# Patient Record
Sex: MALE | Race: WHITE | NOT HISPANIC OR LATINO | Employment: OTHER | URBAN - METROPOLITAN AREA
[De-identification: names, ages, dates, MRNs, and addresses within clinical notes are randomized per-mention and may not be internally consistent; named-entity substitution may affect disease eponyms.]

---

## 2019-01-15 ENCOUNTER — LAB REQUISITION (OUTPATIENT)
Dept: LAB | Facility: HOSPITAL | Age: 74
End: 2019-01-15
Payer: COMMERCIAL

## 2019-01-15 DIAGNOSIS — D49.2 NEOPLASM OF UNSPECIFIED BEHAVIOR OF BONE, SOFT TISSUE, AND SKIN: ICD-10-CM

## 2019-01-15 PROCEDURE — 88305 TISSUE EXAM BY PATHOLOGIST: CPT | Performed by: PATHOLOGY

## 2020-09-24 ENCOUNTER — TRANSCRIBE ORDERS (OUTPATIENT)
Dept: ADMINISTRATIVE | Facility: HOSPITAL | Age: 75
End: 2020-09-24

## 2020-09-24 DIAGNOSIS — J43.2 CENTRIACINAR EMPHYSEMA (HCC): Primary | ICD-10-CM

## 2020-10-28 ENCOUNTER — TELEPHONE (OUTPATIENT)
Dept: CARDIAC REHAB | Facility: HOSPITAL | Age: 75
End: 2020-10-28

## 2020-10-29 ENCOUNTER — CLINICAL SUPPORT (OUTPATIENT)
Dept: PULMONOLOGY | Facility: HOSPITAL | Age: 75
End: 2020-10-29
Attending: INTERNAL MEDICINE

## 2020-10-29 DIAGNOSIS — J43.2 CENTRIACINAR EMPHYSEMA (HCC): ICD-10-CM

## 2020-11-02 ENCOUNTER — CLINICAL SUPPORT (OUTPATIENT)
Dept: PULMONOLOGY | Facility: HOSPITAL | Age: 75
End: 2020-11-02
Attending: INTERNAL MEDICINE
Payer: COMMERCIAL

## 2020-11-02 DIAGNOSIS — J43.2 CENTRIACINAR EMPHYSEMA (HCC): ICD-10-CM

## 2020-11-02 PROCEDURE — G0424 PULMONARY REHAB W EXER: HCPCS

## 2020-11-06 ENCOUNTER — CLINICAL SUPPORT (OUTPATIENT)
Dept: PULMONOLOGY | Facility: HOSPITAL | Age: 75
End: 2020-11-06
Attending: INTERNAL MEDICINE
Payer: COMMERCIAL

## 2020-11-06 DIAGNOSIS — J44.9 OBSTRUCTIVE CHRONIC BRONCHITIS WITHOUT EXACERBATION (HCC): ICD-10-CM

## 2020-11-06 PROCEDURE — G0424 PULMONARY REHAB W EXER: HCPCS

## 2020-11-09 ENCOUNTER — CLINICAL SUPPORT (OUTPATIENT)
Dept: PULMONOLOGY | Facility: HOSPITAL | Age: 75
End: 2020-11-09
Attending: INTERNAL MEDICINE
Payer: COMMERCIAL

## 2020-11-09 DIAGNOSIS — J43.2 CENTRIACINAR EMPHYSEMA (HCC): ICD-10-CM

## 2020-11-09 PROCEDURE — G0424 PULMONARY REHAB W EXER: HCPCS

## 2020-11-13 ENCOUNTER — CLINICAL SUPPORT (OUTPATIENT)
Dept: PULMONOLOGY | Facility: HOSPITAL | Age: 75
End: 2020-11-13
Attending: INTERNAL MEDICINE
Payer: COMMERCIAL

## 2020-11-13 DIAGNOSIS — J44.9 CHRONIC OBSTRUCTIVE PULMONARY DISEASE, UNSPECIFIED COPD TYPE (HCC): ICD-10-CM

## 2020-11-13 PROCEDURE — G0424 PULMONARY REHAB W EXER: HCPCS

## 2020-11-16 ENCOUNTER — CLINICAL SUPPORT (OUTPATIENT)
Dept: PULMONOLOGY | Facility: HOSPITAL | Age: 75
End: 2020-11-16
Attending: INTERNAL MEDICINE
Payer: COMMERCIAL

## 2020-11-16 DIAGNOSIS — J44.9 OBSTRUCTIVE CHRONIC BRONCHITIS WITHOUT EXACERBATION (HCC): ICD-10-CM

## 2020-11-16 PROCEDURE — G0424 PULMONARY REHAB W EXER: HCPCS

## 2020-11-20 ENCOUNTER — CLINICAL SUPPORT (OUTPATIENT)
Dept: PULMONOLOGY | Facility: HOSPITAL | Age: 75
End: 2020-11-20
Attending: INTERNAL MEDICINE
Payer: COMMERCIAL

## 2020-11-20 DIAGNOSIS — J44.9 CHRONIC OBSTRUCTIVE PULMONARY DISEASE, UNSPECIFIED COPD TYPE (HCC): ICD-10-CM

## 2020-11-20 PROCEDURE — G0424 PULMONARY REHAB W EXER: HCPCS

## 2020-11-23 ENCOUNTER — CLINICAL SUPPORT (OUTPATIENT)
Dept: PULMONOLOGY | Facility: HOSPITAL | Age: 75
End: 2020-11-23
Attending: INTERNAL MEDICINE
Payer: COMMERCIAL

## 2020-11-23 DIAGNOSIS — J44.9 CHRONIC OBSTRUCTIVE PULMONARY DISEASE, UNSPECIFIED COPD TYPE (HCC): ICD-10-CM

## 2020-11-23 PROCEDURE — G0424 PULMONARY REHAB W EXER: HCPCS

## 2020-11-27 ENCOUNTER — APPOINTMENT (OUTPATIENT)
Dept: PULMONOLOGY | Facility: HOSPITAL | Age: 75
End: 2020-11-27
Attending: INTERNAL MEDICINE
Payer: COMMERCIAL

## 2020-11-30 ENCOUNTER — CLINICAL SUPPORT (OUTPATIENT)
Dept: PULMONOLOGY | Facility: HOSPITAL | Age: 75
End: 2020-11-30
Attending: INTERNAL MEDICINE
Payer: COMMERCIAL

## 2020-11-30 DIAGNOSIS — J44.9 OBSTRUCTIVE CHRONIC BRONCHITIS WITHOUT EXACERBATION (HCC): ICD-10-CM

## 2020-11-30 PROCEDURE — G0424 PULMONARY REHAB W EXER: HCPCS

## 2020-12-04 ENCOUNTER — CLINICAL SUPPORT (OUTPATIENT)
Dept: PULMONOLOGY | Facility: HOSPITAL | Age: 75
End: 2020-12-04
Attending: INTERNAL MEDICINE
Payer: COMMERCIAL

## 2020-12-04 DIAGNOSIS — J44.9 CHRONIC OBSTRUCTIVE PULMONARY DISEASE, UNSPECIFIED COPD TYPE (HCC): ICD-10-CM

## 2020-12-04 PROCEDURE — G0424 PULMONARY REHAB W EXER: HCPCS

## 2020-12-07 ENCOUNTER — CLINICAL SUPPORT (OUTPATIENT)
Dept: PULMONOLOGY | Facility: HOSPITAL | Age: 75
End: 2020-12-07
Attending: INTERNAL MEDICINE
Payer: COMMERCIAL

## 2020-12-07 DIAGNOSIS — J44.9 CHRONIC OBSTRUCTIVE PULMONARY DISEASE, UNSPECIFIED COPD TYPE (HCC): ICD-10-CM

## 2020-12-07 PROCEDURE — G0424 PULMONARY REHAB W EXER: HCPCS

## 2020-12-11 ENCOUNTER — CLINICAL SUPPORT (OUTPATIENT)
Dept: PULMONOLOGY | Facility: HOSPITAL | Age: 75
End: 2020-12-11
Attending: INTERNAL MEDICINE
Payer: COMMERCIAL

## 2020-12-11 DIAGNOSIS — J44.9 COPD WITH CHRONIC BRONCHITIS (HCC): ICD-10-CM

## 2020-12-11 PROCEDURE — G0424 PULMONARY REHAB W EXER: HCPCS

## 2020-12-14 ENCOUNTER — APPOINTMENT (OUTPATIENT)
Dept: PULMONOLOGY | Facility: HOSPITAL | Age: 75
End: 2020-12-14
Attending: INTERNAL MEDICINE
Payer: COMMERCIAL

## 2020-12-18 ENCOUNTER — CLINICAL SUPPORT (OUTPATIENT)
Dept: PULMONOLOGY | Facility: HOSPITAL | Age: 75
End: 2020-12-18
Attending: INTERNAL MEDICINE
Payer: COMMERCIAL

## 2020-12-18 DIAGNOSIS — J44.9 OBSTRUCTIVE CHRONIC BRONCHITIS WITHOUT EXACERBATION (HCC): ICD-10-CM

## 2020-12-18 PROCEDURE — G0424 PULMONARY REHAB W EXER: HCPCS

## 2020-12-21 ENCOUNTER — CLINICAL SUPPORT (OUTPATIENT)
Dept: PULMONOLOGY | Facility: HOSPITAL | Age: 75
End: 2020-12-21
Attending: INTERNAL MEDICINE
Payer: COMMERCIAL

## 2020-12-21 DIAGNOSIS — J44.9 CHRONIC OBSTRUCTIVE PULMONARY DISEASE, UNSPECIFIED COPD TYPE (HCC): ICD-10-CM

## 2020-12-21 PROCEDURE — G0424 PULMONARY REHAB W EXER: HCPCS

## 2020-12-23 ENCOUNTER — CLINICAL SUPPORT (OUTPATIENT)
Dept: PULMONOLOGY | Facility: HOSPITAL | Age: 75
End: 2020-12-23
Attending: INTERNAL MEDICINE
Payer: COMMERCIAL

## 2020-12-23 DIAGNOSIS — J44.9 CHRONIC OBSTRUCTIVE PULMONARY DISEASE, UNSPECIFIED COPD TYPE (HCC): ICD-10-CM

## 2020-12-23 PROCEDURE — G0424 PULMONARY REHAB W EXER: HCPCS

## 2020-12-25 ENCOUNTER — APPOINTMENT (OUTPATIENT)
Dept: PULMONOLOGY | Facility: HOSPITAL | Age: 75
End: 2020-12-25
Attending: INTERNAL MEDICINE
Payer: COMMERCIAL

## 2020-12-28 ENCOUNTER — CLINICAL SUPPORT (OUTPATIENT)
Dept: PULMONOLOGY | Facility: HOSPITAL | Age: 75
End: 2020-12-28
Attending: INTERNAL MEDICINE
Payer: COMMERCIAL

## 2020-12-28 DIAGNOSIS — J44.9 CHRONIC OBSTRUCTIVE PULMONARY DISEASE, UNSPECIFIED COPD TYPE (HCC): ICD-10-CM

## 2020-12-28 PROCEDURE — G0424 PULMONARY REHAB W EXER: HCPCS

## 2020-12-30 ENCOUNTER — CLINICAL SUPPORT (OUTPATIENT)
Dept: PULMONOLOGY | Facility: HOSPITAL | Age: 75
End: 2020-12-30
Attending: INTERNAL MEDICINE
Payer: COMMERCIAL

## 2020-12-30 DIAGNOSIS — J44.9 CHRONIC OBSTRUCTIVE PULMONARY DISEASE, UNSPECIFIED COPD TYPE (HCC): ICD-10-CM

## 2020-12-30 PROCEDURE — G0424 PULMONARY REHAB W EXER: HCPCS

## 2021-01-01 ENCOUNTER — APPOINTMENT (OUTPATIENT)
Dept: PULMONOLOGY | Facility: HOSPITAL | Age: 76
End: 2021-01-01
Attending: INTERNAL MEDICINE
Payer: COMMERCIAL

## 2021-01-04 ENCOUNTER — CLINICAL SUPPORT (OUTPATIENT)
Dept: PULMONOLOGY | Facility: HOSPITAL | Age: 76
End: 2021-01-04
Attending: INTERNAL MEDICINE
Payer: COMMERCIAL

## 2021-01-04 DIAGNOSIS — J44.9 CHRONIC OBSTRUCTIVE PULMONARY DISEASE, UNSPECIFIED COPD TYPE (HCC): ICD-10-CM

## 2021-01-04 PROCEDURE — G0424 PULMONARY REHAB W EXER: HCPCS

## 2021-01-08 ENCOUNTER — CLINICAL SUPPORT (OUTPATIENT)
Dept: PULMONOLOGY | Facility: HOSPITAL | Age: 76
End: 2021-01-08
Attending: INTERNAL MEDICINE
Payer: COMMERCIAL

## 2021-01-08 DIAGNOSIS — J44.9 CHRONIC OBSTRUCTIVE PULMONARY DISEASE, UNSPECIFIED COPD TYPE (HCC): ICD-10-CM

## 2021-01-08 PROCEDURE — G0424 PULMONARY REHAB W EXER: HCPCS

## 2021-01-11 ENCOUNTER — CLINICAL SUPPORT (OUTPATIENT)
Dept: PULMONOLOGY | Facility: HOSPITAL | Age: 76
End: 2021-01-11
Attending: INTERNAL MEDICINE
Payer: COMMERCIAL

## 2021-01-11 DIAGNOSIS — J44.9 CHRONIC OBSTRUCTIVE PULMONARY DISEASE, UNSPECIFIED COPD TYPE (HCC): ICD-10-CM

## 2021-01-11 PROCEDURE — G0424 PULMONARY REHAB W EXER: HCPCS

## 2021-01-15 ENCOUNTER — CLINICAL SUPPORT (OUTPATIENT)
Dept: PULMONOLOGY | Facility: HOSPITAL | Age: 76
End: 2021-01-15
Attending: INTERNAL MEDICINE
Payer: COMMERCIAL

## 2021-01-15 DIAGNOSIS — J44.9 CHRONIC OBSTRUCTIVE PULMONARY DISEASE, UNSPECIFIED COPD TYPE (HCC): ICD-10-CM

## 2021-01-15 PROCEDURE — G0424 PULMONARY REHAB W EXER: HCPCS

## 2021-01-15 NOTE — PROGRESS NOTES
Pulmonary Rehabilitation Plan of Care   90 Day Plan of Care          Today's date: 1/15/2021  Total visits to date:   Patient name: Osmel Ayon      : 1945  Age: 77 y o  MRN: [id number]  Referring Physician: Kiana Birmingham MD  Provider: [address]  Clinician: Lorena Alamo, MPT    Dx: Centriacinar Emphysema  Date of onset: 2020    COMMENTS:  Patient has completed 21 CO visits as of this date  He performs his sessions w/correct hemodynamic responses  Patient's resting vitals were HR 71 and /52  His peak vitals were HR 85 and /72  At recovery his vitals were HR 75 and /68  He performs his sessions w/3liters supplemental 02  He is able to maintain his 02 saturation at  95% or greater  He rates his program a 5 to 6 on a 1-10 RPE scale  He rated his BAILEY as 3-7/10  He is able to work consistently at a 3 17 MET level  Tasia Flores has been a good rehabilitation candidate due to prior high level of function and willingness to progress  His program will continue to be progressed as he is able to tolerate  He will continue to receive education specific to his disease process  Patient is progressing well in [place]  Tasia Flores has also begun a HEP, utilizing a TM for 15 to 20 minutes days off CO  Correct breathing techniques reviewed and practiced a/patient routinely  His standing balance has improved to Good w/out support of assistive device  He denies any dizziness over the past few weeks  BLE strength has improved to 4+/5  Weight has improved from 224 to 215  [facility] states his stress level is still high, at times 10/10  His BAILEY and physical conditioned is very frustrating to him  Medication compliance: Yes   Comments: Patient admits to medication compliance  Fall Risk: Moderate   Comments: Patient exhibits Good dynamic standing balance and 4+/5 BLE strength  Patient does note occasional bouts of dizziness       EXERCISE/ACTIVITY    Cardiopulmonary Goals:   Min: 45-50 minutes   HR: 20-30 > RHR 80-90   RPE: 4-6  (moderate to moderately hard exercise)   O2 sat: >90% Supplemental 02 will be applied and titrated as per department policy   Modalities: Treadmill, UBE, NuStep and Recumbent bike  Strength trainin-3 days / week, 12-15 repitations  and 1-2 sets per modality    Modalities: Leg Press    Exercise Progression: Progress as tolerated to maintain RPE 4-6      RPE 4-6  Home activity: Independent w/personal ADLs  Sedentary Lifestyle  Goals: 10% improvement in functional capacity, home exercise days opposite PA, improved DASI score and >150 mins of exercise/wk  Education: Benefit of exercise, home exercise, pursed lip breathing, RPE scale and O2 saturation monitoring   Plan:home exercise target 30 mins, 2 days opposite PA and Improved 6MW results  Readiness to change: Action:  (Changing behavior)    NUTRITION    Weight control:    Starting weight: 224        Current Weight: 215-  1/15/2021     Diabetes: N/A  Goals:improved A1c and Improved Rate Your Plate score  Education:More frequent meals, smaller portions  hydration  Plan: Education Video- Diet and Nutrition and Education Class: Healthy Eating  Readiness to change: Action:  (Changing behavior)    PSYCHOSOCIAL    Emotional:  5-9 = Mild Depression Patient rates his stress level as "high", 10/10 at times due to frustration w/limited physical capacity       Social support: Excellent  Goals: Reduce perceived stress to 1-3/10, improved [facility] QOL < 27, PHQ-9 - reduced severity by one level, Physical Fitness in [facility] Score < 3, Social Support in [facility] Score < 3, Daily Activity in [facility] Score < 3, Social Activities in [facility] Score < 3, Quality of Life in [facility] Score < 3 , Change in Health in [place] Score < 3 , Increased interest in doing things, improved sleep and increased energy  Education: signs/sxs of depression  benefits of positive support system  coping mechanisms  depression and chronic disease  Plan: Education Class: Stress and Your Lungs, Relaxation and Mindfulness and Anxiety and Lung Disease  Readiness to change: Action:  (Changing behavior)    OTHER CORE COMPONENTS     Tobacco:   Social History     Tobacco Use   Smoking Status Not on file     Oxygen: 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 approximately 10 years  He has now been 02 dependent at 2 liters during the day for the past 6 to 12 months  Denilson Billy requires 3 liters supplemental 02 to maintain his 02 saturation > 90% during his sessions  Blood pressure: Overall BPs have improved      Restin/52   Exercise:   142/72   Recovery: 128/68    Goals: consistent BP < 130/80, reduced dietary sodium <2300mg, consistent exercise >150 mins/wk and medication compliance  Education: Pulmonary Disease, physiology, Exercise, strength, flexibility, Conservation of energy, oxygen, breathing techniques, Medication, Avoiding Infections and Caregivers  Plan: Causes of lung disease, Prevention and treatment, Exercise benefits and Proper nutrition  Readiness to change: Action:  (Changing behavior)

## 2021-01-18 ENCOUNTER — CLINICAL SUPPORT (OUTPATIENT)
Dept: PULMONOLOGY | Facility: HOSPITAL | Age: 76
End: 2021-01-18
Attending: INTERNAL MEDICINE
Payer: COMMERCIAL

## 2021-01-18 DIAGNOSIS — J44.9 CHRONIC OBSTRUCTIVE PULMONARY DISEASE, UNSPECIFIED COPD TYPE (HCC): ICD-10-CM

## 2021-01-18 PROCEDURE — G0424 PULMONARY REHAB W EXER: HCPCS

## 2021-01-22 ENCOUNTER — CLINICAL SUPPORT (OUTPATIENT)
Dept: PULMONOLOGY | Facility: HOSPITAL | Age: 76
End: 2021-01-22
Attending: INTERNAL MEDICINE
Payer: COMMERCIAL

## 2021-01-22 DIAGNOSIS — J44.9 CHRONIC OBSTRUCTIVE PULMONARY DISEASE, UNSPECIFIED COPD TYPE (HCC): ICD-10-CM

## 2021-01-22 PROCEDURE — G0424 PULMONARY REHAB W EXER: HCPCS

## 2021-01-25 ENCOUNTER — CLINICAL SUPPORT (OUTPATIENT)
Dept: PULMONOLOGY | Facility: HOSPITAL | Age: 76
End: 2021-01-25
Attending: INTERNAL MEDICINE
Payer: COMMERCIAL

## 2021-01-25 ENCOUNTER — IMMUNIZATIONS (OUTPATIENT)
Dept: FAMILY MEDICINE CLINIC | Facility: HOSPITAL | Age: 76
End: 2021-01-25

## 2021-01-25 DIAGNOSIS — J44.9 CHRONIC OBSTRUCTIVE PULMONARY DISEASE, UNSPECIFIED COPD TYPE (HCC): ICD-10-CM

## 2021-01-25 DIAGNOSIS — Z23 ENCOUNTER FOR IMMUNIZATION: Primary | ICD-10-CM

## 2021-01-25 PROCEDURE — 0011A SARS-COV-2 / COVID-19 MRNA VACCINE (MODERNA) 100 MCG: CPT

## 2021-01-25 PROCEDURE — G0424 PULMONARY REHAB W EXER: HCPCS

## 2021-01-25 PROCEDURE — 91301 SARS-COV-2 / COVID-19 MRNA VACCINE (MODERNA) 100 MCG: CPT

## 2021-01-29 ENCOUNTER — CLINICAL SUPPORT (OUTPATIENT)
Dept: PULMONOLOGY | Facility: HOSPITAL | Age: 76
End: 2021-01-29
Attending: INTERNAL MEDICINE
Payer: COMMERCIAL

## 2021-01-29 DIAGNOSIS — J44.9 CHRONIC OBSTRUCTIVE PULMONARY DISEASE, UNSPECIFIED COPD TYPE (HCC): ICD-10-CM

## 2021-01-29 PROCEDURE — G0424 PULMONARY REHAB W EXER: HCPCS

## 2021-01-29 NOTE — PROGRESS NOTES
Medical Director 30 day Pulmonary Rehabilitation Review    I certify that I have met with Maisha Segundo face-to face to provide a 30 day progress review of his pulmonary rehabilitation program at [address] [place] [address]  I have reviewed the most recent individualized treatment plan (ITP), outcomes assessment, and provided opportunity for discussion with the patient    Comments:Continue to progress patient as he is able to tolerate       Continue with current treatment plan : Yes    Please provide the following modifications to the current treatment plan: N/A      Dr Elisa Sharma MD

## 2021-02-01 ENCOUNTER — APPOINTMENT (OUTPATIENT)
Dept: PULMONOLOGY | Facility: HOSPITAL | Age: 76
End: 2021-02-01
Attending: INTERNAL MEDICINE
Payer: COMMERCIAL

## 2021-02-03 ENCOUNTER — CLINICAL SUPPORT (OUTPATIENT)
Dept: PULMONOLOGY | Facility: HOSPITAL | Age: 76
End: 2021-02-03
Attending: INTERNAL MEDICINE
Payer: COMMERCIAL

## 2021-02-03 DIAGNOSIS — J44.9 CHRONIC OBSTRUCTIVE PULMONARY DISEASE, UNSPECIFIED COPD TYPE (HCC): ICD-10-CM

## 2021-02-03 PROCEDURE — G0424 PULMONARY REHAB W EXER: HCPCS

## 2021-02-05 ENCOUNTER — CLINICAL SUPPORT (OUTPATIENT)
Dept: PULMONOLOGY | Facility: HOSPITAL | Age: 76
End: 2021-02-05
Attending: INTERNAL MEDICINE
Payer: COMMERCIAL

## 2021-02-05 DIAGNOSIS — J44.9 CHRONIC OBSTRUCTIVE PULMONARY DISEASE, UNSPECIFIED COPD TYPE (HCC): ICD-10-CM

## 2021-02-05 PROCEDURE — G0424 PULMONARY REHAB W EXER: HCPCS

## 2021-02-08 ENCOUNTER — CLINICAL SUPPORT (OUTPATIENT)
Dept: PULMONOLOGY | Facility: HOSPITAL | Age: 76
End: 2021-02-08
Attending: INTERNAL MEDICINE
Payer: COMMERCIAL

## 2021-02-08 DIAGNOSIS — J44.9 CHRONIC OBSTRUCTIVE PULMONARY DISEASE, UNSPECIFIED COPD TYPE (HCC): ICD-10-CM

## 2021-02-08 PROCEDURE — G0424 PULMONARY REHAB W EXER: HCPCS

## 2021-02-11 NOTE — PROGRESS NOTES
Pulmonary Rehabilitation Plan of Care   120 Day Plan of Care          Today's date: 2021  Total visits to date:   Patient name: Katina Hampton      : 1945  Age: 77 y o  MRN: [id number]  Referring Physician: Enrike Murrell MD  Provider: [address]  Clinician: Arvie Amel Reyes Grams, MPT    Dx: Centriacinar Emphysema  Date of onset: 2020    COMMENTS:  Patient has completed 28 PA visits as of this date  He performs his sessions w/correct hemodynamic responses  Patient's resting vitals were HR 71 and /60  His peak vitals were HR 88 and /66  At recovery his vitals were HR 78 and /66  He performs his sessions w/3liters supplemental 02  He is able to maintain his 02 saturation at  95% or greater  He rates his program a 6 to 7 on a 1-10 RPE scale  He rated his BAILEY as 2-8/10  He is able to work consistently at a 3 17 MET level  Madhavi Spicer has been a good rehabilitation candidate due to prior high level of function and willingness to progress  His program will continue to be progressed as he is able to tolerate  He will continue to receive education specific to his disease process  Patient continues to progress well in PA  Madhavi Spicer has also begun a HEP, utilizing a TM for 15 to 20 minutes days off PA  Correct breathing techniques reviewed and practiced a/patient routinely  His standing balance has improved to Good w/out support of assistive device  He denies any dizziness over the past 30 day period  BLE strength has improved to 4+/5  Weight has improved from 224 to 215  Tiny Ross states his stress level is still high, at times 10/10  His BAILEY and physical conditioned is very frustrating to him  He is now able to ambulate on the TM for 20 minutes w/out intervals and maintaining his 02 sat at 95% or greater  Goal for patient is 30 minutes       Medication compliance: Yes   Comments: Patient admits to medication compliance  Fall Risk: Moderate   Comments: Patient exhibits Good dynamic standing balance and 4+/5 BLE strength  Patient has not complained about dizziness over the past 30 day period  EXERCISE/ACTIVITY    Cardiopulmonary Goals:   Min: 50-60 minutes   HR: 20-30 > RHR 80-90   RPE: 4-6  (moderate to moderately hard exercise)   O2 sat: >90% Supplemental 02 will be applied and titrated as per department policy   Modalities: Treadmill, UBE, NuStep and Recumbent bike  Strength trainin-3 days / week, 12-15 repitations  and 1-2 sets per modality    Modalities: Leg Press    Exercise Progression: Progress as tolerated to maintain RPE 4-6      RPE 4-6  Home activity: Independent w/personal ADLs  Sedentary Lifestyle  Goals: 10% improvement in functional capacity, home exercise days opposite CO, improved DASI score and >150 mins of exercise/wk  Education: Benefit of exercise, home exercise, pursed lip breathing, RPE scale and O2 saturation monitoring   Plan:home exercise target 30 mins, 2 days opposite CO and Improved 6MW results  Readiness to change: Action:  (Changing behavior)    NUTRITION    Weight control:    Starting weight: 224        Current Weight: 215-  1/15/2021     Diabetes: N/A  Goals:improved A1c and Improved Rate Your Plate score  Education:More frequent meals, smaller portions  hydration  Plan: Education Video- Diet and Nutrition and Education Class: Healthy Eating  Readiness to change: Action:  (Changing behavior)    PSYCHOSOCIAL    Emotional:  5-9 = Mild Depression Patient rates his stress level as "high", 10/10 at times due to frustration w/limited physical capacity       Social support: Excellent  Goals: Reduce perceived stress to 1-3/10, improved [facility] QOL < 27, PHQ-9 - reduced severity by one level, Physical Fitness in [facility] Score < 3, Social Support in [facility] Score < 3, Daily Activity in [facility] Score < 3, Social Activities in [facility] Score < 3, Quality of Life in [facility] Score < 3 , Change in Health in [place] Score < 3 , Increased interest in doing things, improved sleep and increased energy  Education: signs/sxs of depression  benefits of positive support system  coping mechanisms  depression and chronic disease  Plan: Education Class: Stress and Your Lungs, Relaxation and Mindfulness and Anxiety and Lung Disease  Readiness to change: Action:  (Changing behavior)    OTHER CORE COMPONENTS     Tobacco:   Social History     Tobacco Use   Smoking Status Not on file     Oxygen: 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 approximately 10 years  He has now been 02 dependent at 2 liters during the day for the past 6 to 12 months  Alba Vicente requires 3 liters supplemental 02 to maintain his 02 saturation > 90% during his sessions  He has been able to increase his program and maintain a 95% or greater 02 saturation w/3liters and proper breathing techniques  Blood pressure: Overall BPs have improved  His resting BPs have consistently been < 130/80     Restin/60   Exercise:   148/66   Recovery: 128/66    Goals: consistent BP < 130/80, reduced dietary sodium <2300mg, consistent exercise >150 mins/wk and medication compliance  Education: Pulmonary Disease, physiology, Exercise, strength, flexibility, Conservation of energy, oxygen, breathing techniques, Medication, Avoiding Infections and Caregivers  Plan: Causes of lung disease, Prevention and treatment, Exercise benefits and Proper nutrition  Readiness to change: Action:  (Changing behavior)

## 2021-02-12 ENCOUNTER — CLINICAL SUPPORT (OUTPATIENT)
Dept: PULMONOLOGY | Facility: HOSPITAL | Age: 76
End: 2021-02-12
Attending: INTERNAL MEDICINE
Payer: COMMERCIAL

## 2021-02-12 DIAGNOSIS — J44.9 OBSTRUCTIVE CHRONIC BRONCHITIS WITHOUT EXACERBATION (HCC): ICD-10-CM

## 2021-02-12 PROCEDURE — G0424 PULMONARY REHAB W EXER: HCPCS

## 2021-02-15 ENCOUNTER — CLINICAL SUPPORT (OUTPATIENT)
Dept: PULMONOLOGY | Facility: HOSPITAL | Age: 76
End: 2021-02-15
Attending: INTERNAL MEDICINE
Payer: COMMERCIAL

## 2021-02-15 DIAGNOSIS — J44.9 CHRONIC OBSTRUCTIVE PULMONARY DISEASE, UNSPECIFIED COPD TYPE (HCC): ICD-10-CM

## 2021-02-15 PROCEDURE — G0424 PULMONARY REHAB W EXER: HCPCS

## 2021-02-19 ENCOUNTER — APPOINTMENT (OUTPATIENT)
Dept: PULMONOLOGY | Facility: HOSPITAL | Age: 76
End: 2021-02-19
Attending: INTERNAL MEDICINE
Payer: COMMERCIAL

## 2021-02-22 ENCOUNTER — IMMUNIZATIONS (OUTPATIENT)
Dept: FAMILY MEDICINE CLINIC | Facility: HOSPITAL | Age: 76
End: 2021-02-22

## 2021-02-22 ENCOUNTER — APPOINTMENT (OUTPATIENT)
Dept: PULMONOLOGY | Facility: HOSPITAL | Age: 76
End: 2021-02-22
Attending: INTERNAL MEDICINE
Payer: COMMERCIAL

## 2021-02-22 DIAGNOSIS — Z23 ENCOUNTER FOR IMMUNIZATION: Primary | ICD-10-CM

## 2021-02-22 PROCEDURE — 91301 SARS-COV-2 / COVID-19 MRNA VACCINE (MODERNA) 100 MCG: CPT

## 2021-02-22 PROCEDURE — 0012A SARS-COV-2 / COVID-19 MRNA VACCINE (MODERNA) 100 MCG: CPT

## 2021-02-24 ENCOUNTER — CLINICAL SUPPORT (OUTPATIENT)
Dept: PULMONOLOGY | Facility: HOSPITAL | Age: 76
End: 2021-02-24
Attending: INTERNAL MEDICINE
Payer: COMMERCIAL

## 2021-02-24 DIAGNOSIS — J44.9 CHRONIC OBSTRUCTIVE PULMONARY DISEASE, UNSPECIFIED COPD TYPE (HCC): ICD-10-CM

## 2021-02-24 PROCEDURE — G0424 PULMONARY REHAB W EXER: HCPCS

## 2021-02-26 ENCOUNTER — CLINICAL SUPPORT (OUTPATIENT)
Dept: PULMONOLOGY | Facility: HOSPITAL | Age: 76
End: 2021-02-26
Attending: INTERNAL MEDICINE
Payer: COMMERCIAL

## 2021-02-26 DIAGNOSIS — J44.9 COPD WITH CHRONIC BRONCHITIS (HCC): ICD-10-CM

## 2021-02-26 PROCEDURE — G0424 PULMONARY REHAB W EXER: HCPCS

## 2021-02-26 NOTE — PROGRESS NOTES
Medical Director 30 day Pulmonary Rehabilitation Review    I certify that I have met with Anaid Jenkins face-to face to provide a 30 day progress review of his/her pulmonary rehabilitation program at [facility] ([place], [place])  I have reviewed the most recent individualized treatment plan (ITP), outcomes assessment, and provided opportunity for discussion with the patient    Comments:     Continue with current treatment plan: yes  Please provide the following modifications to the current treatment plan: No modifications necessary at this time   Patient is continuing to benefit from his current exercise program       Alexsander Adrian

## 2021-03-01 ENCOUNTER — CLINICAL SUPPORT (OUTPATIENT)
Dept: PULMONOLOGY | Facility: HOSPITAL | Age: 76
End: 2021-03-01
Attending: INTERNAL MEDICINE
Payer: COMMERCIAL

## 2021-03-01 DIAGNOSIS — J44.9 CHRONIC OBSTRUCTIVE PULMONARY DISEASE, UNSPECIFIED COPD TYPE (HCC): ICD-10-CM

## 2021-03-01 PROCEDURE — G0424 PULMONARY REHAB W EXER: HCPCS

## 2021-03-03 ENCOUNTER — APPOINTMENT (OUTPATIENT)
Dept: PULMONOLOGY | Facility: HOSPITAL | Age: 76
End: 2021-03-03
Attending: INTERNAL MEDICINE
Payer: COMMERCIAL

## 2021-03-05 ENCOUNTER — CLINICAL SUPPORT (OUTPATIENT)
Dept: PULMONOLOGY | Facility: HOSPITAL | Age: 76
End: 2021-03-05
Attending: INTERNAL MEDICINE
Payer: COMMERCIAL

## 2021-03-05 DIAGNOSIS — J44.9 CHRONIC OBSTRUCTIVE PULMONARY DISEASE, UNSPECIFIED COPD TYPE (HCC): ICD-10-CM

## 2021-03-05 PROCEDURE — G0424 PULMONARY REHAB W EXER: HCPCS

## 2021-03-08 ENCOUNTER — CLINICAL SUPPORT (OUTPATIENT)
Dept: PULMONOLOGY | Facility: HOSPITAL | Age: 76
End: 2021-03-08
Attending: INTERNAL MEDICINE
Payer: COMMERCIAL

## 2021-03-08 DIAGNOSIS — J44.9 CHRONIC OBSTRUCTIVE PULMONARY DISEASE, UNSPECIFIED COPD TYPE (HCC): ICD-10-CM

## 2021-03-08 PROCEDURE — G0424 PULMONARY REHAB W EXER: HCPCS

## 2021-03-12 ENCOUNTER — CLINICAL SUPPORT (OUTPATIENT)
Dept: PULMONOLOGY | Facility: HOSPITAL | Age: 76
End: 2021-03-12
Attending: INTERNAL MEDICINE
Payer: COMMERCIAL

## 2021-03-12 DIAGNOSIS — J44.9 CHRONIC OBSTRUCTIVE PULMONARY DISEASE, UNSPECIFIED COPD TYPE (HCC): ICD-10-CM

## 2021-03-12 PROCEDURE — G0424 PULMONARY REHAB W EXER: HCPCS

## 2021-03-12 NOTE — PROGRESS NOTES
Pulmonary Rehabilitation Plan of Care   Final Discharge    Today's date: 3/12/2021  Total visits to date:   Patient name: Rommel Retana      : 1945  Age: 77 y o  MRN: [id number]  Referring Physician: Lurene Osgood, MD  Provider: Harriet Trevino  Clinician: Marylou Mcclellan MS, CCRP    Dx: Centriacinar Emphysema  Date of onset: 2020    COMMENTS:  Patient has completed 39 Pulmonary Rehab visits as of this date  He performs his sessions w/correct hemodynamic responses  Patient's resting vitals were HR 73 and /78  His peak vitals were HR 78 and /82  At recovery his vitals were HR 69 and /68  He performs his sessions w/ 3L supplemental 02  He is able to maintain his 02 saturation at  91% or greater during his exercise session  He rates his program a 4-5 on the 1-10 RPE scale  He rated his BAILEY as 2-8/10  He is able to work consistently at a 3 17 MET level  Bailee Mayorga has been a good rehabilitation candidate due to prior high level of function and willingness to progress  His program will continue to be progressed as he is able to tolerate  He will continue to receive education specific to his disease process  Patient continues to progress well in [place]  Bailee Mayorga has also begun a HEP, utilizing a TM for 25 to 30 minutes opposite days of Pulmonary Rehab  Correct breathing techniques reviewed and practiced w/ patient routinely  His standing balance has improved to Good w/out support of assistive device  He denies any dizziness over the past 30 day period  BLE strength has improved to 4+/5  Weight has improved from 224 to 215  [place] states his stress level is still high, at times 10/10  His BAILEY and physical conditioned is very frustrating to him  He is now able to ambulate on the TM for 30 minutes w/out intervals and maintaining his 02 sat at 95% or greater, which was his goal when beginning Pulmonary Rehab      Patient's individualized outcomes have improved greatly in many categories  Patient's 6MWT improved from 450 to 950 feet while at [facility] rehab  Thus, his MET level improved from a 1 7 to a 2 4 form start to finish of the program  Kylee Dior is now exercising at home 3-4 days/week on his Treadmill for about 25-30 minutes/day  Tien's maximum RPE improved from a 6 to a 4/10 during his 6MWT  Additionally, Kylee Dior maximum SOB has improved from an 8 to a 6/10 during his 6MWT  Tien's SOB Questionnaire has improved by 22%, [facility] Quality of Life has improved by 31%, COPD Assessment has improved by 25%, and depression score improved by 100%  Medication compliance: Yes   Comments: Patient admits to medication compliance  Fall Risk: Moderate   Comments: Patient exhibits Good dynamic standing balance and 4+/5 BLE strength  Patient has not complained about dizziness over the past 30 day period  EXERCISE/ACTIVITY    Cardiopulmonary Goals:   Min: 50-60 minutes   HR: 20-30 > RHR 80-90   RPE: 4-6  (moderate to moderately hard exercise)   O2 sat: >90% Supplemental 02 will be applied and titrated as per department policy   Modalities: Treadmill, UBE, NuStep and Recumbent bike  Strength trainin-3 days / week, 12-15 repitations  and 1-2 sets per modality    Modalities: Leg Press    Exercise Progression: Progress as tolerated to maintain RPE 4-6      RPE 4-6  Home activity: Independent w/personal ADLs   Sedentary Lifestyle  Goals: 10% improvement in functional capacity, home exercise days opposite [place], improved DASI score and >150 mins of exercise/wk  Education: Benefit of exercise, home exercise, pursed lip breathing, RPE scale and O2 saturation monitoring   Plan:home exercise target 30 mins, 2 days opposite [place] and Improved 6MW results  Readiness to change: Action:  (Changing behavior)    NUTRITION    Weight control:    Starting weight: 224        Current Weight: 215-  1/15/2021     Diabetes: N/A  Goals:improved A1c and Improved Rate Your Plate score  Education:More frequent meals, smaller portions  hydration  Plan: Education Video- Diet and Nutrition and Education Class: Healthy Eating  Readiness to change: Action:  (Changing behavior)    PSYCHOSOCIAL    Emotional:  5-9 = Mild Depression Patient rates his stress level as "high", 10/10 at times due to frustration w/limited physical capacity  Social support: Excellent  Goals: Reduce perceived stress to 1-3/10, improved [facility] QOL < 27, PHQ-9 - reduced severity by one level, Physical Fitness in [facility] Score < 3, Social Support in [facility] Score < 3, Daily Activity in [facility] Score < 3, Social Activities in [facility] Score < 3, Quality of Life in [facility] Score < 3 , Change in Health in [facility] Score < 3 , Increased interest in doing things, improved sleep and increased energy  Education: signs/sxs of depression  benefits of positive support system  coping mechanisms  depression and chronic disease  Plan: Education Class: Stress and Your Lungs, Relaxation and Mindfulness and Anxiety and Lung Disease  Readiness to change: Action:  (Changing behavior)    OTHER CORE COMPONENTS     Tobacco:   Social History     Tobacco Use   Smoking Status Not on file     Oxygen: 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 approximately 10 years  He has now been 02 dependent at 2 liters during the day for the past 6 to 12 months  Brigida Dempsey requires 3 liters supplemental 02 to maintain his 02 saturation > 90% during his sessions  He has been able to increase his program and maintain a 95% or greater 02 saturation w/3liters and proper breathing techniques  Blood pressure: Overall BPs have improved  His resting BPs have consistently been < 130/80     Restin/78   Exercise:   168/82   Recovery: 124/68    Goals: consistent BP < 130/80, reduced dietary sodium <2300mg, consistent exercise >150 mins/wk and medication compliance  Education: Pulmonary Disease, physiology, Exercise, strength, flexibility, Conservation of energy, oxygen, breathing techniques, Medication, Avoiding Infections and Caregivers  Plan: Causes of lung disease, Prevention and treatment, Exercise benefits and Proper nutrition  Readiness to change: Action:  (Changing behavior)

## 2021-03-15 ENCOUNTER — APPOINTMENT (OUTPATIENT)
Dept: PULMONOLOGY | Facility: HOSPITAL | Age: 76
End: 2021-03-15
Attending: INTERNAL MEDICINE
Payer: COMMERCIAL

## 2021-03-17 ENCOUNTER — APPOINTMENT (OUTPATIENT)
Dept: PULMONOLOGY | Facility: HOSPITAL | Age: 76
End: 2021-03-17
Attending: INTERNAL MEDICINE
Payer: COMMERCIAL

## 2021-03-19 ENCOUNTER — APPOINTMENT (OUTPATIENT)
Dept: PULMONOLOGY | Facility: HOSPITAL | Age: 76
End: 2021-03-19
Attending: INTERNAL MEDICINE
Payer: COMMERCIAL

## 2021-11-08 ENCOUNTER — IMMUNIZATIONS (OUTPATIENT)
Dept: FAMILY MEDICINE CLINIC | Facility: HOSPITAL | Age: 76
End: 2021-11-08

## 2021-11-08 DIAGNOSIS — Z23 ENCOUNTER FOR IMMUNIZATION: Primary | ICD-10-CM

## 2021-11-08 PROCEDURE — 91306 COVID-19 MODERNA VACC 0.25 ML BOOSTER: CPT

## 2021-11-08 PROCEDURE — 0013A COVID-19 MODERNA VACC 0.25 ML BOOSTER: CPT
